# Patient Record
Sex: FEMALE | Race: WHITE | NOT HISPANIC OR LATINO | URBAN - METROPOLITAN AREA
[De-identification: names, ages, dates, MRNs, and addresses within clinical notes are randomized per-mention and may not be internally consistent; named-entity substitution may affect disease eponyms.]

---

## 2018-03-15 NOTE — PATIENT DISCUSSION
Discussed with patient that she has significant cataract formation in the left eye and I recommended that she return to Dr. Myriam Schaumann for surgery in her left eye.

## 2018-07-24 ENCOUNTER — IMPORTED ENCOUNTER (OUTPATIENT)
Dept: URBAN - METROPOLITAN AREA CLINIC 31 | Facility: CLINIC | Age: 53
End: 2018-07-24

## 2018-07-24 PROCEDURE — 92015 DETERMINE REFRACTIVE STATE: CPT

## 2018-07-24 PROCEDURE — 92004 COMPRE OPH EXAM NEW PT 1/>: CPT

## 2020-10-01 ENCOUNTER — IMPORTED ENCOUNTER (OUTPATIENT)
Dept: URBAN - METROPOLITAN AREA CLINIC 31 | Facility: CLINIC | Age: 55
End: 2020-10-01

## 2020-10-01 PROBLEM — H25.813: Noted: 2020-10-01

## 2020-10-01 PROCEDURE — 92310 CONTACT LENS FITTING OU: CPT

## 2020-10-01 PROCEDURE — 92015 DETERMINE REFRACTIVE STATE: CPT

## 2020-10-01 PROCEDURE — 92014 COMPRE OPH EXAM EST PT 1/>: CPT

## 2020-10-15 ENCOUNTER — IMPORTED ENCOUNTER (OUTPATIENT)
Dept: URBAN - METROPOLITAN AREA CLINIC 31 | Facility: CLINIC | Age: 55
End: 2020-10-15

## 2020-10-15 NOTE — PATIENT DISCUSSION
1.  Refractive error2. Astigmatism Continue present contact lens modality. Stop/wear and call if any redness pain or decrease in vision occur. Remove lash. Lotemax sample BID for several days.   Final CL Rx.

## 2022-04-02 ASSESSMENT — VISUAL ACUITY
OD_SC: J514''
OD_CC: 20/40
OS_CC: J1+
OS_SC: 20/20
OD_CC: 20/30-1
OS_SC: 20/30
OD_SC: 20/20
OS_CC: 20/50
OS_SC: J314''
OD_SC: J114''
OD_SC: 20/20

## 2022-04-02 ASSESSMENT — TONOMETRY
OD_IOP_MMHG: 12
OS_IOP_MMHG: 12
OS_IOP_MMHG: 13
OD_IOP_MMHG: 13

## 2022-07-30 ENCOUNTER — TELEPHONE ENCOUNTER (OUTPATIENT)
Age: 57
End: 2022-07-30

## 2022-07-31 ENCOUNTER — TELEPHONE ENCOUNTER (OUTPATIENT)
Age: 57
End: 2022-07-31

## 2023-01-23 ENCOUNTER — ESTABLISHED PATIENT (OUTPATIENT)
Dept: URBAN - METROPOLITAN AREA CLINIC 31 | Facility: CLINIC | Age: 58
End: 2023-01-23

## 2023-01-23 DIAGNOSIS — H52.7: ICD-10-CM

## 2023-01-23 PROCEDURE — 92310-3 LEVEL 3 CONTACT LENS MANAGEMENT

## 2023-01-23 PROCEDURE — 92015 DETERMINE REFRACTIVE STATE: CPT

## 2023-01-23 PROCEDURE — 92014 COMPRE OPH EXAM EST PT 1/>: CPT

## 2023-01-23 ASSESSMENT — VISUAL ACUITY
OS_CC: 20/20
OD_CC: J1+
OS_CC: J1+

## 2023-01-23 ASSESSMENT — TONOMETRY
OS_IOP_MMHG: 13
OD_IOP_MMHG: 13